# Patient Record
Sex: MALE | Race: WHITE | Employment: OTHER | URBAN - METROPOLITAN AREA
[De-identification: names, ages, dates, MRNs, and addresses within clinical notes are randomized per-mention and may not be internally consistent; named-entity substitution may affect disease eponyms.]

---

## 2022-12-09 ENCOUNTER — HOSPITAL ENCOUNTER (INPATIENT)
Age: 67
LOS: 1 days | Discharge: HOME | DRG: 176 | End: 2022-12-10
Attending: EMERGENCY MEDICINE | Admitting: INTERNAL MEDICINE
Payer: MEDICARE

## 2022-12-09 ENCOUNTER — APPOINTMENT (OUTPATIENT)
Dept: CT IMAGING | Age: 67
DRG: 176 | End: 2022-12-09
Payer: MEDICARE

## 2022-12-09 DIAGNOSIS — I10 ESSENTIAL HYPERTENSION: ICD-10-CM

## 2022-12-09 DIAGNOSIS — S32.020A COMPRESSION FRACTURE OF L2 VERTEBRA, INITIAL ENCOUNTER (HCC): ICD-10-CM

## 2022-12-09 DIAGNOSIS — I26.94 MULTIPLE SUBSEGMENTAL PULMONARY EMBOLI WITHOUT ACUTE COR PULMONALE (HCC): Primary | ICD-10-CM

## 2022-12-09 PROBLEM — I26.93 SINGLE SUBSEGMENTAL PULMONARY EMBOLISM WITHOUT ACUTE COR PULMONALE (HCC): Status: ACTIVE | Noted: 2022-12-09

## 2022-12-09 PROBLEM — I26.99 ACUTE PULMONARY EMBOLISM (HCC): Status: ACTIVE | Noted: 2022-12-09

## 2022-12-09 PROBLEM — E66.9 OBESITY: Status: ACTIVE | Noted: 2022-12-09

## 2022-12-09 LAB
A/G RATIO: 1.2 (ref 1.1–2.2)
ALBUMIN SERPL-MCNC: 4 G/DL (ref 3.4–5)
ALP BLD-CCNC: 59 U/L (ref 40–129)
ALT SERPL-CCNC: 14 U/L (ref 10–40)
ANION GAP SERPL CALCULATED.3IONS-SCNC: 7 MMOL/L (ref 3–16)
ANION GAP SERPL CALCULATED.3IONS-SCNC: 9 MMOL/L (ref 3–16)
APTT: 26.6 SEC (ref 23–34.3)
AST SERPL-CCNC: 14 U/L (ref 15–37)
BASOPHILS ABSOLUTE: 0.1 K/UL (ref 0–0.2)
BASOPHILS ABSOLUTE: 0.1 K/UL (ref 0–0.2)
BASOPHILS RELATIVE PERCENT: 0.8 %
BASOPHILS RELATIVE PERCENT: 0.8 %
BILIRUB SERPL-MCNC: 0.5 MG/DL (ref 0–1)
BILIRUBIN URINE: NEGATIVE
BLOOD, URINE: ABNORMAL
BUN BLDV-MCNC: 12 MG/DL (ref 7–20)
BUN BLDV-MCNC: 12 MG/DL (ref 7–20)
CALCIUM SERPL-MCNC: 8.9 MG/DL (ref 8.3–10.6)
CALCIUM SERPL-MCNC: 9 MG/DL (ref 8.3–10.6)
CHLORIDE BLD-SCNC: 101 MMOL/L (ref 99–110)
CHLORIDE BLD-SCNC: 102 MMOL/L (ref 99–110)
CLARITY: CLEAR
CO2: 25 MMOL/L (ref 21–32)
CO2: 28 MMOL/L (ref 21–32)
COLOR: YELLOW
CREAT SERPL-MCNC: 0.9 MG/DL (ref 0.8–1.3)
CREAT SERPL-MCNC: 1.1 MG/DL (ref 0.8–1.3)
EKG ATRIAL RATE: 70 BPM
EKG ATRIAL RATE: 88 BPM
EKG DIAGNOSIS: NORMAL
EKG DIAGNOSIS: NORMAL
EKG P AXIS: 50 DEGREES
EKG P AXIS: 51 DEGREES
EKG P-R INTERVAL: 128 MS
EKG P-R INTERVAL: 132 MS
EKG Q-T INTERVAL: 374 MS
EKG Q-T INTERVAL: 386 MS
EKG QRS DURATION: 84 MS
EKG QRS DURATION: 86 MS
EKG QTC CALCULATION (BAZETT): 416 MS
EKG QTC CALCULATION (BAZETT): 452 MS
EKG R AXIS: -46 DEGREES
EKG R AXIS: -58 DEGREES
EKG T AXIS: 70 DEGREES
EKG T AXIS: 85 DEGREES
EKG VENTRICULAR RATE: 70 BPM
EKG VENTRICULAR RATE: 88 BPM
EOSINOPHILS ABSOLUTE: 0.1 K/UL (ref 0–0.6)
EOSINOPHILS ABSOLUTE: 0.1 K/UL (ref 0–0.6)
EOSINOPHILS RELATIVE PERCENT: 0.8 %
EOSINOPHILS RELATIVE PERCENT: 1 %
EPITHELIAL CELLS, UA: ABNORMAL /HPF (ref 0–5)
GFR SERPL CREATININE-BSD FRML MDRD: >60 ML/MIN/{1.73_M2}
GFR SERPL CREATININE-BSD FRML MDRD: >60 ML/MIN/{1.73_M2}
GLUCOSE BLD-MCNC: 119 MG/DL (ref 70–99)
GLUCOSE BLD-MCNC: 95 MG/DL (ref 70–99)
GLUCOSE URINE: NEGATIVE MG/DL
HCT VFR BLD CALC: 45 % (ref 40.5–52.5)
HCT VFR BLD CALC: 45.8 % (ref 40.5–52.5)
HEMOGLOBIN: 15.2 G/DL (ref 13.5–17.5)
HEMOGLOBIN: 15.3 G/DL (ref 13.5–17.5)
KETONES, URINE: NEGATIVE MG/DL
LEUKOCYTE ESTERASE, URINE: NEGATIVE
LYMPHOCYTES ABSOLUTE: 1.9 K/UL (ref 1–5.1)
LYMPHOCYTES ABSOLUTE: 2.3 K/UL (ref 1–5.1)
LYMPHOCYTES RELATIVE PERCENT: 17.5 %
LYMPHOCYTES RELATIVE PERCENT: 18.7 %
MCH RBC QN AUTO: 30.4 PG (ref 26–34)
MCH RBC QN AUTO: 31.5 PG (ref 26–34)
MCHC RBC AUTO-ENTMCNC: 33.3 G/DL (ref 31–36)
MCHC RBC AUTO-ENTMCNC: 34 G/DL (ref 31–36)
MCV RBC AUTO: 91.4 FL (ref 80–100)
MCV RBC AUTO: 92.5 FL (ref 80–100)
MICROSCOPIC EXAMINATION: YES
MONOCYTES ABSOLUTE: 1 K/UL (ref 0–1.3)
MONOCYTES ABSOLUTE: 1.2 K/UL (ref 0–1.3)
MONOCYTES RELATIVE PERCENT: 9.2 %
MONOCYTES RELATIVE PERCENT: 9.7 %
MUCUS: ABNORMAL /LPF
NEUTROPHILS ABSOLUTE: 7.1 K/UL (ref 1.7–7.7)
NEUTROPHILS ABSOLUTE: 9.2 K/UL (ref 1.7–7.7)
NEUTROPHILS RELATIVE PERCENT: 70 %
NEUTROPHILS RELATIVE PERCENT: 71.5 %
NITRITE, URINE: NEGATIVE
PDW BLD-RTO: 13.1 % (ref 12.4–15.4)
PDW BLD-RTO: 13.2 % (ref 12.4–15.4)
PH UA: 5.5 (ref 5–8)
PLATELET # BLD: 236 K/UL (ref 135–450)
PLATELET # BLD: 279 K/UL (ref 135–450)
PMV BLD AUTO: 7.9 FL (ref 5–10.5)
PMV BLD AUTO: 8.3 FL (ref 5–10.5)
POTASSIUM REFLEX MAGNESIUM: 3.7 MMOL/L (ref 3.5–5.1)
POTASSIUM SERPL-SCNC: 3.6 MMOL/L (ref 3.5–5.1)
PROTEIN UA: NEGATIVE MG/DL
RBC # BLD: 4.87 M/UL (ref 4.2–5.9)
RBC # BLD: 5.02 M/UL (ref 4.2–5.9)
RBC UA: ABNORMAL /HPF (ref 0–4)
SODIUM BLD-SCNC: 135 MMOL/L (ref 136–145)
SODIUM BLD-SCNC: 137 MMOL/L (ref 136–145)
SPECIFIC GRAVITY UA: 1.02 (ref 1–1.03)
TOTAL PROTEIN: 7.4 G/DL (ref 6.4–8.2)
TROPONIN: <0.01 NG/ML
URINE TYPE: ABNORMAL
UROBILINOGEN, URINE: 0.2 E.U./DL
WBC # BLD: 10.1 K/UL (ref 4–11)
WBC # BLD: 12.9 K/UL (ref 4–11)
WBC UA: ABNORMAL /HPF (ref 0–5)

## 2022-12-09 PROCEDURE — 80053 COMPREHEN METABOLIC PANEL: CPT

## 2022-12-09 PROCEDURE — 93010 ELECTROCARDIOGRAM REPORT: CPT | Performed by: INTERNAL MEDICINE

## 2022-12-09 PROCEDURE — 96374 THER/PROPH/DIAG INJ IV PUSH: CPT

## 2022-12-09 PROCEDURE — 99285 EMERGENCY DEPT VISIT HI MDM: CPT

## 2022-12-09 PROCEDURE — 6360000002 HC RX W HCPCS: Performed by: EMERGENCY MEDICINE

## 2022-12-09 PROCEDURE — 93970 EXTREMITY STUDY: CPT

## 2022-12-09 PROCEDURE — 6370000000 HC RX 637 (ALT 250 FOR IP): Performed by: NURSE PRACTITIONER

## 2022-12-09 PROCEDURE — 96375 TX/PRO/DX INJ NEW DRUG ADDON: CPT

## 2022-12-09 PROCEDURE — 81001 URINALYSIS AUTO W/SCOPE: CPT

## 2022-12-09 PROCEDURE — 6360000004 HC RX CONTRAST MEDICATION: Performed by: EMERGENCY MEDICINE

## 2022-12-09 PROCEDURE — 93005 ELECTROCARDIOGRAM TRACING: CPT | Performed by: INTERNAL MEDICINE

## 2022-12-09 PROCEDURE — 84484 ASSAY OF TROPONIN QUANT: CPT

## 2022-12-09 PROCEDURE — 85730 THROMBOPLASTIN TIME PARTIAL: CPT

## 2022-12-09 PROCEDURE — 2500000003 HC RX 250 WO HCPCS: Performed by: EMERGENCY MEDICINE

## 2022-12-09 PROCEDURE — 74177 CT ABD & PELVIS W/CONTRAST: CPT

## 2022-12-09 PROCEDURE — 93005 ELECTROCARDIOGRAM TRACING: CPT | Performed by: EMERGENCY MEDICINE

## 2022-12-09 PROCEDURE — 1200000000 HC SEMI PRIVATE

## 2022-12-09 PROCEDURE — 2580000003 HC RX 258: Performed by: NURSE PRACTITIONER

## 2022-12-09 PROCEDURE — 71260 CT THORAX DX C+: CPT | Performed by: EMERGENCY MEDICINE

## 2022-12-09 PROCEDURE — 85025 COMPLETE CBC W/AUTO DIFF WBC: CPT

## 2022-12-09 PROCEDURE — 36415 COLL VENOUS BLD VENIPUNCTURE: CPT

## 2022-12-09 RX ORDER — ACETAMINOPHEN 650 MG/1
650 SUPPOSITORY RECTAL EVERY 6 HOURS PRN
Status: DISCONTINUED | OUTPATIENT
Start: 2022-12-09 | End: 2022-12-10 | Stop reason: HOSPADM

## 2022-12-09 RX ORDER — ACETAMINOPHEN 325 MG/1
650 TABLET ORAL EVERY 6 HOURS PRN
Status: DISCONTINUED | OUTPATIENT
Start: 2022-12-09 | End: 2022-12-10 | Stop reason: HOSPADM

## 2022-12-09 RX ORDER — ONDANSETRON 2 MG/ML
4 INJECTION INTRAMUSCULAR; INTRAVENOUS EVERY 6 HOURS PRN
Status: DISCONTINUED | OUTPATIENT
Start: 2022-12-09 | End: 2022-12-10 | Stop reason: HOSPADM

## 2022-12-09 RX ORDER — LISINOPRIL 10 MG/1
5 TABLET ORAL DAILY
Status: DISCONTINUED | OUTPATIENT
Start: 2022-12-09 | End: 2022-12-10 | Stop reason: HOSPADM

## 2022-12-09 RX ORDER — HEPARIN SODIUM 1000 [USP'U]/ML
80 INJECTION, SOLUTION INTRAVENOUS; SUBCUTANEOUS ONCE
Status: COMPLETED | OUTPATIENT
Start: 2022-12-09 | End: 2022-12-09

## 2022-12-09 RX ORDER — SODIUM CHLORIDE 0.9 % (FLUSH) 0.9 %
5-40 SYRINGE (ML) INJECTION PRN
Status: DISCONTINUED | OUTPATIENT
Start: 2022-12-09 | End: 2022-12-10 | Stop reason: HOSPADM

## 2022-12-09 RX ORDER — HEPARIN SODIUM 10000 [USP'U]/100ML
1880 INJECTION, SOLUTION INTRAVENOUS CONTINUOUS
Status: DISCONTINUED | OUTPATIENT
Start: 2022-12-09 | End: 2022-12-09

## 2022-12-09 RX ORDER — ONDANSETRON 4 MG/1
4 TABLET, ORALLY DISINTEGRATING ORAL EVERY 8 HOURS PRN
Status: DISCONTINUED | OUTPATIENT
Start: 2022-12-09 | End: 2022-12-10 | Stop reason: HOSPADM

## 2022-12-09 RX ORDER — KETOROLAC TROMETHAMINE 30 MG/ML
15 INJECTION, SOLUTION INTRAMUSCULAR; INTRAVENOUS ONCE
Status: COMPLETED | OUTPATIENT
Start: 2022-12-09 | End: 2022-12-09

## 2022-12-09 RX ORDER — HEPARIN SODIUM 1000 [USP'U]/ML
40 INJECTION, SOLUTION INTRAVENOUS; SUBCUTANEOUS PRN
Status: DISCONTINUED | OUTPATIENT
Start: 2022-12-09 | End: 2022-12-09

## 2022-12-09 RX ORDER — POLYETHYLENE GLYCOL 3350 17 G/17G
17 POWDER, FOR SOLUTION ORAL DAILY PRN
Status: DISCONTINUED | OUTPATIENT
Start: 2022-12-09 | End: 2022-12-10 | Stop reason: HOSPADM

## 2022-12-09 RX ORDER — SODIUM CHLORIDE 9 MG/ML
INJECTION, SOLUTION INTRAVENOUS PRN
Status: DISCONTINUED | OUTPATIENT
Start: 2022-12-09 | End: 2022-12-10 | Stop reason: HOSPADM

## 2022-12-09 RX ORDER — CLOPIDOGREL BISULFATE 75 MG/1
75 TABLET ORAL DAILY
Status: DISCONTINUED | OUTPATIENT
Start: 2022-12-09 | End: 2022-12-09

## 2022-12-09 RX ORDER — HEPARIN SODIUM 1000 [USP'U]/ML
80 INJECTION, SOLUTION INTRAVENOUS; SUBCUTANEOUS PRN
Status: DISCONTINUED | OUTPATIENT
Start: 2022-12-09 | End: 2022-12-09

## 2022-12-09 RX ORDER — ASPIRIN 81 MG/1
81 TABLET ORAL DAILY
Status: DISCONTINUED | OUTPATIENT
Start: 2022-12-09 | End: 2022-12-10 | Stop reason: HOSPADM

## 2022-12-09 RX ORDER — ATORVASTATIN CALCIUM 40 MG/1
80 TABLET, FILM COATED ORAL NIGHTLY
Status: DISCONTINUED | OUTPATIENT
Start: 2022-12-09 | End: 2022-12-10 | Stop reason: HOSPADM

## 2022-12-09 RX ORDER — TRAMADOL HYDROCHLORIDE 50 MG/1
50 TABLET ORAL EVERY 6 HOURS PRN
Status: DISCONTINUED | OUTPATIENT
Start: 2022-12-09 | End: 2022-12-10 | Stop reason: HOSPADM

## 2022-12-09 RX ORDER — SODIUM CHLORIDE 0.9 % (FLUSH) 0.9 %
5-40 SYRINGE (ML) INJECTION EVERY 12 HOURS SCHEDULED
Status: DISCONTINUED | OUTPATIENT
Start: 2022-12-09 | End: 2022-12-10 | Stop reason: HOSPADM

## 2022-12-09 RX ADMIN — ATORVASTATIN CALCIUM 80 MG: 40 TABLET, FILM COATED ORAL at 22:39

## 2022-12-09 RX ADMIN — LISINOPRIL 5 MG: 10 TABLET ORAL at 08:54

## 2022-12-09 RX ADMIN — SODIUM CHLORIDE, PRESERVATIVE FREE 10 ML: 5 INJECTION INTRAVENOUS at 08:56

## 2022-12-09 RX ADMIN — HEPARIN SODIUM 8340 UNITS: 1000 INJECTION INTRAVENOUS; SUBCUTANEOUS at 03:41

## 2022-12-09 RX ADMIN — ASPIRIN 81 MG: 81 TABLET, COATED ORAL at 08:54

## 2022-12-09 RX ADMIN — CLOPIDOGREL BISULFATE 75 MG: 75 TABLET ORAL at 08:54

## 2022-12-09 RX ADMIN — APIXABAN 10 MG: 5 TABLET, FILM COATED ORAL at 08:54

## 2022-12-09 RX ADMIN — KETOROLAC TROMETHAMINE 15 MG: 30 INJECTION, SOLUTION INTRAMUSCULAR at 02:03

## 2022-12-09 RX ADMIN — IOPAMIDOL 75 ML: 755 INJECTION, SOLUTION INTRAVENOUS at 02:33

## 2022-12-09 RX ADMIN — APIXABAN 10 MG: 5 TABLET, FILM COATED ORAL at 22:38

## 2022-12-09 RX ADMIN — HEPARIN SODIUM 1880 UNITS/HR: 10000 INJECTION, SOLUTION INTRAVENOUS at 03:46

## 2022-12-09 RX ADMIN — SODIUM CHLORIDE, PRESERVATIVE FREE 10 ML: 5 INJECTION INTRAVENOUS at 22:39

## 2022-12-09 ASSESSMENT — PAIN - FUNCTIONAL ASSESSMENT: PAIN_FUNCTIONAL_ASSESSMENT: 0-10

## 2022-12-09 ASSESSMENT — PAIN DESCRIPTION - DESCRIPTORS: DESCRIPTORS: ACHING

## 2022-12-09 ASSESSMENT — PAIN DESCRIPTION - LOCATION: LOCATION: FLANK

## 2022-12-09 ASSESSMENT — PAIN SCALES - GENERAL
PAINLEVEL_OUTOF10: 8
PAINLEVEL_OUTOF10: 10

## 2022-12-09 ASSESSMENT — PAIN DESCRIPTION - ORIENTATION: ORIENTATION: RIGHT

## 2022-12-09 NOTE — CARE COORDINATION
CASE MANAGEMENT INITIAL ASSESSMENT      Chart reviewed for possible discharge needs and plan of care. Met with the patient  today to inform of social work role & services, discuss discharge options and levels of care, community resources, and support systems. Primary contact information: Darnell Izaguirre, cell [address] :  son above    Can this person be reached and be able to respond quickly, such as within a few minutes or hours? Yes  Who would be your back-up decision maker? Name:   Phone Number:    Admission Date:12/9/2022   Status:  [] OBS  [x]  INPT    []Emergency [id number]  Diagnosis: Multiple subsegmental pulmonary emboli without acute cor pulmonale ([facility] [I26.94]  Single subsegmental pulmonary embolism without acute cor pulmonale (HCC) [I26.93]  Compression fracture of L2 vertebra, initial encounter ([facility] [X80.845J]    Is this a Readmission?:  No      Insurance:   [x] Medicare   [facility] Medicare   [] Medicaid   [] Commercial through employer:  [] Currently uninsured    Precert required for SNF: Yes         3 night stay required: No    Living arrangements, Adls, Care Needs, Prior to Admission:   [] Independent with ADL's; able to follow up with PCP and obtain all meds; has good family support  [] Requires assistance to complete ADL's      Durable Medical Equipment at Home:  [] None    [] Margaret Rainey [] Cane [] RTS [] [facility] []Shower Chair  []  02 [] HHN [] CPAP [] BiPap  [] Hospital Bed [] W/C [] Other_____    Services in the home and/or outpatient, prior to admission:  [] None current  [] Active with agency:     Current PCP:Amanda Navarrete                                 Prescription coverage? Yes   Will pt require financial assistance with medications Yes possibly;  lives out of car and has limited resources.      Transportation needs: No     Dialysis Facility: [x] Not applicable  Name:  Address:  Dialysis Schedule:  Phone:  Fax:    PT/OT Recs:  [x] None  [] Home at discharge with PRN assistance  [] Home at discharge with recommendation for [address] [employer] [facility]  [] Discharge to [facility]   [] Discharge to [address]  [] Discharge to [facility] without skilled needs    Hospital Exemption Notification (HEN):   [x] Not applicable  [] To be completed at discharge if SNF    Barriers to discharge:  [] No barriers identified at this time     [x] Barriers to dc & dc planning include: patient is homeless; on a wait list for subsidized housing apt complex in [facility]    Discharge Plan/comments: patient declines option for referral for SNF despite writer's strong recommendations. States he is \"too independent for that. \" Plans to resume living in his car and typically salazar in [employer] parking lot; states he has money to buy food and gas and anticipates no needs. DC planner recommends getting Meds to Beds at dc and confirming appointment with PCP or arranging f/u with our [facility]. Patient states he will f/u with provider but also stated to writer that he did not f/u years ago after cardiac bypass procedure. Resources given for local social service agencies.      [facility]OC on chart for clinician signature    DUDLEY Bryant  12/9/2022

## 2022-12-09 NOTE — ED NOTES
Report called to [facility] RN of C[facility], all questions addressed. Pt aware of admission status with no questions verbalized. PT denies needs at this time. Awaiting admitting RN for pt transport to floor.         Jennifer Zee RN  12/09/22 [id number]

## 2022-12-09 NOTE — ED PROVIDER NOTES
[facility] Emergency Department      CHIEF COMPLAINT  Flank Pain (Right sided flank pain that started at 1200. )      HISTORY OF PRESENT ILLNESS  Rosemary Mahajan is a 74 y.o. male with a history of coronary artery disease presents with right flank and rib pain. He states that started about noon today. He states it is somewhat constant pain but definitely worse when he takes a deep breath. He feels slightly short of breath. He denies fevers or cough. No abdominal pain. The pain is all located in the right posterior, lateral and anterior lower rib cage. No rash. No dysuria, vomiting or diarrhea. .   No other complaints, modifying factors or associated symptoms. I have reviewed the following from the nursing documentation.     Past Medical History:   Diagnosis Date    CAD (coronary artery disease)      Past Surgical History:   Procedure Laterality Date    APPENDECTOMY      CORONARY ANGIOPLASTY WITH STENT PLACEMENT  5/25/15     Family History   Family history unknown: Yes     Social History     Socioeconomic History    Marital status: Single     Spouse name: Not on file    Number of children: Not on file    Years of education: Not on file    Highest education level: Not on file   Occupational History    Not on file   Tobacco Use    Smoking status: Former     Packs/day: 2.00     Types: Cigarettes     Quit date: 2015     Years since quittin.5    Smokeless tobacco: Not on file   Substance and Sexual Activity    Alcohol use: No     Alcohol/week: 0.0 standard drinks    Drug use: No    Sexual activity: Yes   Other Topics Concern    Not on file   Social History Narrative    Not on file     Social Determinants of Health     Financial Resource Strain: Not on file   Food Insecurity: Not on file   Transportation Needs: Not on file   Physical Activity: Not on file   Stress: Not on file   Social Connections: Not on file   Intimate Partner Violence: Not on file   Housing Stability: Not on file Current Facility-Administered Medications   Medication Dose Route Frequency Provider Last Rate Last Admin    heparin (porcine) injection 8,340 Units  80 Units/kg IntraVENous Once Addie Watts MD        heparin (porcine) injection 8,340 Units  80 Units/kg IntraVENous PRN Addie Watts MD        heparin (porcine) injection 4,170 Units  40 Units/kg IntraVENous PRN Addie Watts MD        heparin 25,000 unit in sodium chloride 0.45% 250 mL (premix) infusion  1,880 Units/hr IntraVENous Continuous Addie Watts MD         Current Outpatient Medications   Medication Sig Dispense Refill    atorvastatin (LIPITOR) 80 MG tablet TAKE ONE TABLET BY MOUTH NIGHTLY 30 tablet 0    lisinopril (PRINIVIL;ZESTRIL) 5 MG tablet Take 1 tablet by mouth daily 30 tablet 11    aspirin 81 MG EC tablet Take 1 tablet by mouth daily 30 tablet 3    clopidogrel (PLAVIX) 75 MG tablet Take 1 tablet by mouth daily 30 tablet 11    nitroGLYCERIN (NITROSTAT) 0.4 MG SL tablet Place 1 tablet under the tongue every 5 minutes as needed for Chest pain 25 tablet 1     Allergies   Allergen Reactions    Beta Adrenergic Blockers Other (See Comments)     History of Bradycardia          REVIEW OF SYSTEMS    General:  No fevers  Eyes:  No recent vison changes  ENT:  No sore throat, no nasal congestion  Cardiovascular:  no palpitations  Respiratory: Pleuritic pain in the right ribs with mild shortness of breath. Gastrointestinal:  No abdominal pain, no vomiting, no diarrhea. Right flank pain.   Musculoskeletal:  No muscle pain, no joint pain  Skin:  No rash   Neurologic:  No speech problems, no headache, no extremity numbness, no extremity weakness  Genitourinary:  No dysuria  Extremities:  no edema, no pain      Unless otherwise stated in this report, this patient's positive and negative responses for review of systems (constitutional, eyes, ENT, cardiovascular, respiratory, gastrointestinal, neurological, genitourinary, musculoskeletal, integument systems and systems related to the presenting problem) are either stated in the preceding paragraph, were not pertinent or were negative for the symptoms and/or complaints related to the medical problem. PHYSICAL EXAM  BP (!) 165/98   Pulse 98   Temp 98 °F (36.7 °C) (Oral)   Resp 16   Ht 6' 2\" (1.88 m)   Wt 230 lb (104.3 kg)   SpO2 97%   BMI 29.53 kg/m²   GENERAL APPEARANCE: Awake and alert. Cooperative. No acute distress. HEAD: Normocephalic. Atraumatic. EYES: PERRL. EOM's grossly intact. ENT: Mucous membranes are moist.   NECK: Supple, trachea midline. HEART: RRR. Mild reproducible tenderness over the right lateral rib cage. LUNGS: Respirations unlabored. CTAB. Good air exchange. No wheezes, rales, or rhonchi. Speaking comfortably in full sentences. ABDOMEN: Soft. Non-distended. Non-tender. No guarding or rebound. EXTREMITIES: No peripheral edema. MAEE. No acute deformities. SKIN: Warm, dry and intact. No acute rashes. NEUROLOGICAL: Alert and oriented X 3. CN II-XII grossly intact. PSYCHIATRIC: Normal mood and affect. LABS  I have reviewed all labs for this visit.    Results for orders placed or performed during the hospital encounter of 12/09/22   CBC with Auto Differential   Result Value Ref Range    WBC 12.9 (H) 4.0 - 11.0 K/uL    RBC 5.02 4.20 - 5.90 M/uL    Hemoglobin 15.2 13.5 - 17.5 g/dL    Hematocrit 45.8 40.5 - 52.5 %    MCV 91.4 80.0 - 100.0 fL    MCH 30.4 26.0 - 34.0 pg    MCHC 33.3 31.0 - 36.0 g/dL    RDW 13.2 12.4 - 15.4 %    Platelets 292 589 - 671 K/uL    MPV 8.3 5.0 - 10.5 fL    Neutrophils % 71.5 %    Lymphocytes % 17.5 %    Monocytes % 9.2 %    Eosinophils % 1.0 %    Basophils % 0.8 %    Neutrophils Absolute 9.2 (H) 1.7 - 7.7 K/uL    Lymphocytes Absolute 2.3 1.0 - 5.1 K/uL    Monocytes Absolute 1.2 0.0 - 1.3 K/uL    Eosinophils Absolute 0.1 0.0 - 0.6 K/uL    Basophils Absolute 0.1 0.0 - 0.2 K/uL   Comprehensive Metabolic Panel   Result Value Ref Range Sodium 135 (L) 136 - 145 mmol/L    Potassium 3.6 3.5 - 5.1 mmol/L    Chloride 101 99 - 110 mmol/L    CO2 25 21 - 32 mmol/L    Anion Gap 9 3 - 16    Glucose 119 (H) 70 - 99 mg/dL    BUN 12 7 - 20 mg/dL    Creatinine 0.9 0.8 - 1.3 mg/dL    Est, Glom Filt Rate >60 >60    Calcium 9.0 8.3 - 10.6 mg/dL    Total Protein 7.4 6.4 - 8.2 g/dL    Albumin 4.0 3.4 - 5.0 g/dL    Albumin/Globulin Ratio 1.2 1.1 - 2.2    Total Bilirubin 0.5 0.0 - 1.0 mg/dL    Alkaline Phosphatase 59 40 - 129 U/L    ALT 14 10 - 40 U/L    AST 14 (L) 15 - 37 U/L   Urinalysis   Result Value Ref Range    Color, UA Yellow Straw/Yellow    Clarity, UA Clear Clear    Glucose, Ur Negative Negative mg/dL    Bilirubin Urine Negative Negative    Ketones, Urine Negative Negative mg/dL    Specific Gravity, UA 1.020 1.005 - 1.030    Blood, Urine SMALL (A) Negative    pH, UA 5.5 5.0 - 8.0    Protein, UA Negative Negative mg/dL    Urobilinogen, Urine 0.2 <2.0 E.U./dL    Nitrite, Urine Negative Negative    Leukocyte Esterase, Urine Negative Negative    Microscopic Examination YES     Urine Type NotGiven    Troponin   Result Value Ref Range    Troponin <0.01 <0.01 ng/mL   EKG 12 Lead   Result Value Ref Range    Ventricular Rate 88 BPM    Atrial Rate 88 BPM    P-R Interval 128 ms    QRS Duration 84 ms    Q-T Interval 374 ms    QTc Calculation (Bazett) 452 ms    P Axis 50 degrees    R Axis -58 degrees    T Axis 70 degrees    Diagnosis       Sinus rhythm with Premature supraventricular complexesPossible Left atrial enlargementLeft axis deviationNonspecific ST abnormalityAbnormal ECGWhen compared with ECG of 26-MAY-2015 06:09,Premature supraventricular complexes are now PresentVent. rate has increased BY  31 BPMT wave inversion no longer evident in Inferior leads         EKG  The Ekg interpreted by myself  normal sinus rhythm with a rate of 88  Axis is   Left axis deviation  QTc is  normal  Intervals and Durations are unremarkable.       No specific ST-T wave changes appreciated. No evidence of acute ischemia. No significant change from prior EKG dated May 26, 2015    Cardiac Monitoring: The cardiac monitor revealed normal sinus rhythm as interpreted by me. The cardiac monitor was ordered secondary to the patient's complaint of chest pain and to monitor the patient for dysrhythmia. RADIOLOGY  X-RAYS: ALL IMAGES INCLUDING PLAIN FILMS, CT, ULTRASOUND AND MRI HAVE BEEN READ BY THE RADIOLOGIST. I have personally reviewed plain film images and have reviewed the radiology reports. CT ABDOMEN PELVIS W IV CONTRAST Additional Contrast? None   Preliminary Result   1. Acute segmental to subsegmental pulmonary emboli noted in the right lower   lobe, with a nonocclusive embolism in the right middle lobe. No central   emboli or findings to suggest cardiac strain. This was discussed with Dr. Radha Mathews in the emergency department at 3:24 a.m. on 12/09/2022.   2. Subsegmental ground-glass infiltrate in the right lower lobe felt to   represent a pulmonary infarct. 3. Patchy atelectatic changes seen otherwise. 4. Atherosclerosis including coronary artery involvement. 5. There is an acute to subacute mild superior endplate compression fracture   of L2 with approximately 20-30% height loss. 6. No acute intra-abdominal or pelvic abnormality. CT CHEST PULMONARY EMBOLISM W CONTRAST   Preliminary Result   1. Acute segmental to subsegmental pulmonary emboli noted in the right lower   lobe, with a nonocclusive embolism in the right middle lobe. No central   emboli or findings to suggest cardiac strain. This was discussed with Dr. Radha Mathews in the emergency department at 3:24 a.m. on 12/09/2022.   2. Subsegmental ground-glass infiltrate in the right lower lobe felt to   represent a pulmonary infarct. 3. Patchy atelectatic changes seen otherwise. 4. Atherosclerosis including coronary artery involvement.    5. There is an acute to subacute mild superior endplate compression fracture   of L2 with approximately 20-30% height loss. 6. No acute intra-abdominal or pelvic abnormality. Rechecks: Physical assessment performed. Patient has been updated on his CT findings and is agreeable to admission. Pain is improved after Toradol here. Critical Care:I personally spent a total of 40 minutes of critical care time in obtaining history, performing a physical exam, bedside monitoring of interventions, collecting and interpreting tests and discussion with consultants but excluding time spent performing procedures, treating other patients and teaching time. Sepsis:  Is this patient to be included in the SEP-1 Core Measure due to severe sepsis or septic shock? No   Exclusion criteria - the patient is NOT to be included for SEP-1 Core Measure due to: Infection is not suspected       ED COURSE/MDM  Patient seen and evaluated. Here the patient is afebrile with normal vitals signs, other than hypertension which she has a history of. Old records reviewed. Here the patient has pleuritic pain on the right with most of the pain located in the right lateral ribs. He has a benign abdominal exam.  EKG shows sinus rhythm with no ischemic changes. Troponin is negative, lab work all appears to be at baseline. Did get a CT of the chest to evaluate for PE given the pleuritic pain. CT does show a acute segmental and subsegmental right middle lobe and lower lobe PE. There is also evidence of pulmonary infarct. In light of this I have started a heparin drip and will admit him to the hospitalist for further work-up. He also has an incidental finding of a subacute compression fracture of L2. He is not having any back pain at the site. Labs and imaging reviewed and results discussed with patient. Patient was reassessed as noted above . Plan of care discussed with patient. Patient in agreement with plan. CLINICAL IMPRESSION  1. Multiple subsegmental pulmonary emboli without acute cor pulmonale (HCC)    2. Compression fracture of L2 vertebra, initial encounter ([facility])        Blood pressure (!) 165/98, pulse 98, temperature 98 °F (36.7 °C), temperature source Oral, resp. rate 16, height 6' 2\" (1.88 m), weight 230 lb (104.3 kg), SpO2 97 %. DISPOSITION  Gayathri Goodrich was admitted in stable condition. Dean Lozada MD am the primary clinician of record.     (Please note this note was completed with a voice recognition program.  Efforts were made to edit the dictations but occasionally words are mis-transcribed.)        Jesus Cowden, MD  12/09/22 2259

## 2022-12-09 NOTE — PROGRESS NOTES
4 Eyes Skin Assessment     NAME:  Paul Richey  YOB: 1955  MEDICAL RECORD NUMBER:  [id number]    The patient is being assessed for  Admission    I agree that One RN have performed a thorough Head to Toe Skin Assessment on the patient. ALL assessment sites listed below have been assessed. Areas assessed by both nurses:    Head, Face, Ears, Shoulders, Back, Chest, Arms, Elbows, Hands, Sacrum. Buttock, Coccyx, Ischium, and Legs. Feet and Heels        Does the Patient have a Wound?  No noted wound(s)       Earnest Prevention initiated by RN: No   Wound Care Orders initiated by RN: No    Pressure Injury (Stage 3,4, Unstageable, DTI, NWPT, and Complex wounds) if present place referral order by RN under : No    New and Established Ostomies, if present place, referral order under : No      Nurse 1 eSignature: Electronically signed by Tucker Cuba RN on 12/9/22 at 7:01 AM EST    **SHARE this note so that the co-signing nurse is able to place an eSignature**    Nurse 2 eSignature: Electronically signed by Aura Mccarty RN on 12/9/22 at 7:02 AM EST

## 2022-12-09 NOTE — CONSULTS
Pharmacy to Manage Heparin Infusion per Hospital Policy    Dx: PE  Pt wt = 104.3 kg  Baseline aPTT = __ at __    Oral factor Xa-inhibitors may alter and elevate anti-Xa levels used for unfractionated heparin monitoring. As a result, anti-Xa monitoring is not accurate while Xa-inhibitor activity is detectable. Utilize aPTT monitoring when patient received an oral factor Xa-inhibitor (apixaban, betrixaban, edoxaban or rivaroxaban) within 72 hours prior to admission (please document last administration time). The goal is to allow a washout of oral factor Xa-inhibitors by using aPTT for 72 hours, then change to ant-Xa levels for UFH. Heparin (weight-based) Infusion: VTE/DVT/PE  Heparin 80 units/kg IVP bolus (max 10,000 units) followed by Heparin infusion at 18 units/kg/hr (recommended max initial rate: 2100 units/hr). Recheck anti-Xa (unless aPTT being used) in 6 hours. Goal anti-Xa 0.3-0.7 IU/mL  Goal aPTT =  seconds.   Anuj Pedroza D.12/9/2022 3:37 AM

## 2022-12-09 NOTE — H&P
The patient      Hospital Medicine History & Physical      PCP: Brittani Meade    Date of Admission: 12/9/2022    Date of Service: Pt seen/examined on 12/9/2022 and Admitted to Inpatient with expected LOS greater than two midnights due to medical therapy. Chief Complaint: Right flank pain and shortness of breath    History Of Present Illness:      74 y.o. male, with past medical history of hypertension, CAD, hyperlipidemia and obesity, who presented to [facility] with right flank pain and shortness of breath. History obtained from the patient and review of EMR. Stated around noon this afternoon he developed a right flank pain that radiated to his rib. He stated the pain was worse on deep inspiration. The patient stated he is homeless and has been living in his car in the [place] parking lot since April. He stated throughout the day his symptoms were getting worse so he decided to go to the emergency room for further evaluation. In the emergency room a CT abdomen pelvis was obtained that revealedan acute to subacute mild superior endplate compression fracture of L2 with approximately 20 to 30% height loss. No acute intra-abdominal or pelvic abnormality. A CT chest pulmonary embolism was also obtained that revealedAcute segmental to subsegmental pulmonary emboli noted in the right lower lobe, with a nonocclusive embolism in the right middle lobe. No central emboli or findings to suggest cardiac strain. Subsegmental groundglass infiltrate in the right lower lobe felt to represent a pulmonary infarct. The patient was started on Eliquis. The patient stated he does not get out and move around a lot and is not able to eat a healthy diet. He has been admitted for further evaluation and treatment. Bilateral lower extremity venous Dopplers have been ordered. The patient denied any other associated symptoms as well as any aggravating and/or alleviating factors.  At the time of this assessment, the patient was resting comfortably in bed. He currently denies any chest pain, back pain, abdominal pain, shortness of breath, numbness, tingling, N/V/C/D, fever and/or chills. Past Medical History:          Diagnosis Date    CAD (coronary artery disease)      Past Surgical History:          Procedure Laterality Date    APPENDECTOMY      CORONARY ANGIOPLASTY WITH STENT PLACEMENT  5/25/15     Medications Prior to Admission:      Prior to Admission medications    Medication Sig Start Date End Date Taking? Authorizing Provider   atorvastatin (LIPITOR) 80 MG tablet TAKE ONE TABLET BY MOUTH NIGHTLY 5/5/16   Laurie Schafer MD   lisinopril (PRINIVIL;ZESTRIL) 5 MG tablet Take 1 tablet by mouth daily 9/10/15   Laurie Schafer MD   aspirin 81 MG EC tablet Take 1 tablet by mouth daily 5/28/15   [facility] GurvinderimOSWALD CNP   clopidogrel (PLAVIX) 75 MG tablet Take 1 tablet by mouth daily 5/28/15   [facility] GurvinderimOSWALD CNP   nitroGLYCERIN (NITROSTAT) 0.4 MG SL tablet Place 1 tablet under the tongue every 5 minutes as needed for Chest pain 5/28/15   Bridget Brim, APRN - CNP     Allergies:  Beta adrenergic blockers    Social History:      The patient currently lives at home    TOBACCO:   reports that he quit smoking about 7 years ago. He smoked an average of 2 packs per day. He does not have any smokeless tobacco history on file. ETOH:   reports no history of alcohol use. E-cigarette/Vaping       Questions Responses    E-cigarette/Vaping Use     Start Date     Passive Exposure     Quit Date     Counseling Given     Comments           Family History:      Reviewed and negative in regards to presenting illness/complaint. Family history unknown: Yes       REVIEW OF SYSTEMS COMPLETED:   Pertinent positives as noted in the HPI. All other systems reviewed and negative.     PHYSICAL EXAM PERFORMED:    BP (!) 165/98   Pulse 98   Temp 98 °F (36.7 °C) (Oral)   Resp 16   Ht 6' 2\" (1.88 m)   Wt 230 lb (104.3 kg) SpO2 97%   BMI 29.53 kg/m²     General appearance: Pleasant, obese male in no apparent distress, appears stated age and cooperative. HEENT:. Pupils equal, round, and reactive to light. Extra ocular muscles intact. Conjunctivae/corneas clear. Neck: Supple, with full range of motion. No jugular venous distention. Trachea midline. Respiratory:  Normal respiratory effort. Clear to auscultation, bilaterally without Rales/Wheezes/Rhonchi. Cardiovascular:  Regular rate and rhythm with normal S1/S2 without murmurs, rubs or gallops. Abdomen: Soft, obese, round non-tender, non-distended with normal bowel sounds. Musculoskeletal:  No clubbing, cyanosis or edema bilaterally. Full range of motion without deformity. Skin: Skin color, texture, turgor normal.  No significant rashes or lesions. Neurologic:  Neurovascularly intact Cranial nerves: II-XII intact, grossly non-focal.  Psychiatric:  Alert and oriented, thought content appropriate, normal insight  Capillary Refill: Brisk,3 seconds, normal  Peripheral Pulses: +2 palpable, equal bilaterally     Labs:     Recent Labs     12/09/22 0148   WBC 12.9*   HGB 15.2   HCT 45.8        Recent Labs     12/09/22 0148   *   K 3.6      CO2 25   BUN 12   CREATININE 0.9   CALCIUM 9.0     Recent Labs     12/09/22 0148   AST 14*   ALT 14   BILITOT 0.5   ALKPHOS 59     Recent Labs     12/09/22 0148   TROPONINI <0.01     Urinalysis:      Lab Results   Component Value Date/Time    NITRU Negative 12/09/2022 03:02 AM    WBCUA 0-2 12/09/2022 03:02 AM    RBCUA 0-2 12/09/2022 03:02 AM    BLOODU SMALL 12/09/2022 03:02 AM    SPECGRAV 1.020 12/09/2022 03:02 AM    GLUCOSEU Negative 12/09/2022 03:02 AM     Radiology:     CXR: I have reviewed the CXR with the following interpretation: N/A    EKG:  I have reviewed the EKG with the following interpretation: The Ekg interpreted by me in the absence of a cardiologist shows.  Sinus rhythm with Premature supraventricular complexesPossible Left atrial enlargementLeft axis deviationNonspecific ST abnormalityAbnormal ECGWhen compared with ECG of 26-MAY-2015 06:09,Premature supraventricular complexes are now PresentVent. rate has increased BY  31 BPMT wave inversion no longer evident in Inferior leads    CT ABDOMEN PELVIS W IV CONTRAST Additional Contrast? None   Preliminary Result   1. Acute segmental to subsegmental pulmonary emboli noted in the right lower   lobe, with a nonocclusive embolism in the right middle lobe. No central   emboli or findings to suggest cardiac strain. This was discussed with Dr. Caleb Jackman in the emergency department at 3:24 a.m. on 12/09/2022.   2. Subsegmental ground-glass infiltrate in the right lower lobe felt to   represent a pulmonary infarct. 3. Patchy atelectatic changes seen otherwise. 4. Atherosclerosis including coronary artery involvement. 5. There is an acute to subacute mild superior endplate compression fracture   of L2 with approximately 20-30% height loss. 6. No acute intra-abdominal or pelvic abnormality. CT CHEST PULMONARY EMBOLISM W CONTRAST   Preliminary Result   1. Acute segmental to subsegmental pulmonary emboli noted in the right lower   lobe, with a nonocclusive embolism in the right middle lobe. No central   emboli or findings to suggest cardiac strain. This was discussed with Dr. Caleb Jackman in the emergency department at 3:24 a.m. on 12/09/2022.   2. Subsegmental ground-glass infiltrate in the right lower lobe felt to   represent a pulmonary infarct. 3. Patchy atelectatic changes seen otherwise. 4. Atherosclerosis including coronary artery involvement. 5. There is an acute to subacute mild superior endplate compression fracture   of L2 with approximately 20-30% height loss. 6. No acute intra-abdominal or pelvic abnormality.            Consults:    IP CONSULT TO HOSPITALIST    ASSESSMENT:    Active Hospital Problems    Diagnosis Date Noted    HTN (hypertension) [I10] 12/09/2022     Priority: Medium    Obesity [E66.9] 12/09/2022     Priority: Medium    Acute pulmonary embolism ([address] [I26.99] 12/09/2022     Priority: Medium    HLD (hyperlipidemia) [E78.5] 09/10/2015    CAD (coronary artery disease) [I25.10]      PLAN:    Right flank pain and shortness of breath in setting of acute pulmonary embolism. -CT abdomen and pelvis revealed: There is an acute to subacute mild superior endplate compression fracture of L2 with approximately 20 to 30% height loss. No acute intra-abdominal or pelvic abnormality. -CT chest pulmonary embolism revealed: Acute segmental to subsegmental pulmonary emboli noted in the right lower lobe, with a nonocclusive embolism in the right middle lobe. No central emboli or findings to suggest cardiac strain. Subsegmental groundglass infiltrate in the right lower lobe felt to represent a pulmonary infarct.  -Eliquis initiated in ED and continued twice daily.  -Telemetry monitoring  -prn pain medication  -oxygen therapy if needed  -bilateral venous dopplers lower extremities    Essential hypertension in setting of known CAD  -Continue lisinopril  -Continue aspirin and Plavix    Hyperlipidemia  -Continue atorvastatin    Obesity  With Body mass index is 30 kg/m². Complicating assessment and treatment. Placing patient at risk for multiple co-morbidities as well as early death and contributing to the patient's presentation. Counseled on weight loss     Leukocytosis  -Secondary to stress response  -CBC in a.m.  -UA negative for infection    DVT Prophylaxis: Eliquis    Diet: No diet orders on file    Code Status: Prior    PT/OT Eval Status: No indication for need at this time    Dispo -2-3 days pending clinical improvement     Freddie Cantor, APRN - CNP    Thank you Jabier Egan for the opportunity to be involved in this patient's care.  If you have any questions or concerns please feel free to contact me at [phone].  --------------------------DR. Indra Mitchell----------------------------

## 2022-12-10 VITALS
OXYGEN SATURATION: 93 % | HEART RATE: 66 BPM | RESPIRATION RATE: 18 BRPM | HEIGHT: 74 IN | BODY MASS INDEX: 29.52 KG/M2 | SYSTOLIC BLOOD PRESSURE: 147 MMHG | TEMPERATURE: 98.2 F | WEIGHT: 230 LBS | DIASTOLIC BLOOD PRESSURE: 88 MMHG

## 2022-12-10 LAB
ANION GAP SERPL CALCULATED.3IONS-SCNC: 8 MMOL/L (ref 3–16)
BASOPHILS ABSOLUTE: 0.1 K/UL (ref 0–0.2)
BASOPHILS RELATIVE PERCENT: 0.7 %
BUN BLDV-MCNC: 12 MG/DL (ref 7–20)
CALCIUM SERPL-MCNC: 8.9 MG/DL (ref 8.3–10.6)
CHLORIDE BLD-SCNC: 102 MMOL/L (ref 99–110)
CO2: 26 MMOL/L (ref 21–32)
CREAT SERPL-MCNC: 0.9 MG/DL (ref 0.8–1.3)
EOSINOPHILS ABSOLUTE: 0.1 K/UL (ref 0–0.6)
EOSINOPHILS RELATIVE PERCENT: 0.6 %
GFR SERPL CREATININE-BSD FRML MDRD: >60 ML/MIN/{1.73_M2}
GLUCOSE BLD-MCNC: 108 MG/DL (ref 70–99)
HCT VFR BLD CALC: 41 % (ref 40.5–52.5)
HEMOGLOBIN: 14.4 G/DL (ref 13.5–17.5)
LYMPHOCYTES ABSOLUTE: 1.9 K/UL (ref 1–5.1)
LYMPHOCYTES RELATIVE PERCENT: 17.9 %
MCH RBC QN AUTO: 32.1 PG (ref 26–34)
MCHC RBC AUTO-ENTMCNC: 35.1 G/DL (ref 31–36)
MCV RBC AUTO: 91.4 FL (ref 80–100)
MONOCYTES ABSOLUTE: 1.2 K/UL (ref 0–1.3)
MONOCYTES RELATIVE PERCENT: 11.6 %
NEUTROPHILS ABSOLUTE: 7.4 K/UL (ref 1.7–7.7)
NEUTROPHILS RELATIVE PERCENT: 69.2 %
PDW BLD-RTO: 12.9 % (ref 12.4–15.4)
PLATELET # BLD: 238 K/UL (ref 135–450)
PMV BLD AUTO: 8.1 FL (ref 5–10.5)
POTASSIUM REFLEX MAGNESIUM: 3.6 MMOL/L (ref 3.5–5.1)
RBC # BLD: 4.48 M/UL (ref 4.2–5.9)
SODIUM BLD-SCNC: 136 MMOL/L (ref 136–145)
WBC # BLD: 10.7 K/UL (ref 4–11)

## 2022-12-10 PROCEDURE — 2580000003 HC RX 258: Performed by: NURSE PRACTITIONER

## 2022-12-10 PROCEDURE — 80048 BASIC METABOLIC PNL TOTAL CA: CPT

## 2022-12-10 PROCEDURE — 6370000000 HC RX 637 (ALT 250 FOR IP): Performed by: NURSE PRACTITIONER

## 2022-12-10 PROCEDURE — 36415 COLL VENOUS BLD VENIPUNCTURE: CPT

## 2022-12-10 PROCEDURE — 85025 COMPLETE CBC W/AUTO DIFF WBC: CPT

## 2022-12-10 RX ORDER — ATORVASTATIN CALCIUM 80 MG/1
80 TABLET, FILM COATED ORAL DAILY
Qty: 30 TABLET | Refills: 0 | Status: SHIPPED | OUTPATIENT
Start: 2022-12-10

## 2022-12-10 RX ORDER — LISINOPRIL 5 MG/1
5 TABLET ORAL DAILY
Qty: 30 TABLET | Refills: 0 | Status: SHIPPED | OUTPATIENT
Start: 2022-12-10

## 2022-12-10 RX ADMIN — SODIUM CHLORIDE, PRESERVATIVE FREE 10 ML: 5 INJECTION INTRAVENOUS at 10:07

## 2022-12-10 RX ADMIN — ASPIRIN 81 MG: 81 TABLET, COATED ORAL at 10:07

## 2022-12-10 RX ADMIN — APIXABAN 10 MG: 5 TABLET, FILM COATED ORAL at 10:06

## 2022-12-10 RX ADMIN — LISINOPRIL 5 MG: 10 TABLET ORAL at 10:06

## 2022-12-10 ASSESSMENT — PAIN SCALES - GENERAL
PAINLEVEL_OUTOF10: 0
PAINLEVEL_OUTOF10: 0

## 2022-12-10 NOTE — PLAN OF CARE
Problem: Discharge Planning  Goal: Discharge to home or other facility with appropriate resources  12/10/2022 1240 by Shasta Mooney RN  Outcome: Adequate for Discharge  12/10/2022 0532 by Vicente Johns RN  Outcome: Progressing

## 2022-12-10 NOTE — PROGRESS NOTES
Hospitalist Progress Note      PCP: Arden German    Date of Admission: 12/9/2022    Chief Complaint: right flank pain, SOB    Hospital Course:   74 y.o. male, with past medical history of hypertension, CAD, hyperlipidemia and obesity, who presented to [facility] with right flank pain and shortness of breath. History obtained from the patient and review of EMR. Stated around noon this afternoon he developed a right flank pain that radiated to his rib. He stated the pain was worse on deep inspiration. The patient stated he is homeless and has been living in his car in the [employer] [place] since April. He stated throughout the day his symptoms were getting worse so he decided to go to the emergency room for further evaluation. In the emergency room a CT abdomen pelvis was obtained that revealedan acute to subacute mild superior endplate compression fracture of L2 with approximately 20 to 30% height loss. No acute intra-abdominal or pelvic abnormality. A CT chest pulmonary embolism was also obtained that revealedAcute segmental to subsegmental pulmonary emboli noted in the right lower lobe, with a nonocclusive embolism in the right middle lobe. No central emboli or findings to suggest cardiac strain. Subsegmental groundglass infiltrate in the right lower lobe felt to represent a pulmonary infarct. The patient was started on Eliquis. The patient stated he does not get out and move around a lot and is not able to eat a healthy diet. He has been admitted for further evaluation and treatment. Bilateral lower extremity venous Dopplers have been ordered. The patient denied any other associated symptoms as well as any aggravating and/or alleviating factors. At the time of this assessment, the patient was resting comfortably in bed. He currently denies any chest pain, back pain, abdominal pain, shortness of breath, numbness, tingling, N/V/C/D, fever and/or chills.      Subjective: still having right flank pain. SOB improved. Medications:  Reviewed    Infusion Medications    sodium chloride       Scheduled Medications    apixaban  10 mg Oral BID    Followed by    Lindy Cohen ON 12/16/2022] apixaban  5 mg Oral BID    aspirin  81 mg Oral Daily    atorvastatin  80 mg Oral Nightly    lisinopril  5 mg Oral Daily    sodium chloride flush  5-40 mL IntraVENous 2 times per day     PRN Meds: sodium chloride flush, sodium chloride, ondansetron **OR** ondansetron, polyethylene glycol, acetaminophen **OR** acetaminophen, traMADol    No intake or output data in the 24 hours ending 12/09/22 2126    Physical Exam Performed:    BP (!) 158/88   Pulse 91   Temp 98.7 °F (37.1 °C) (Oral)   Resp 20   Ht 6' 2\" (1.88 m)   Wt 230 lb (104.3 kg)   SpO2 95%   BMI 29.53 kg/m²     General appearance: Pleasant, obese male in no apparent distress, appears stated age and cooperative. HEENT:. Pupils equal, round, and reactive to light. Extra ocular muscles intact. Conjunctivae/corneas clear. Neck: Supple, with full range of motion. No jugular venous distention. Trachea midline. Respiratory:  Normal respiratory effort. Clear to auscultation, bilaterally without Rales/Wheezes/Rhonchi. Cardiovascular:  Regular rate and rhythm with normal S1/S2 without murmurs, rubs or gallops. Abdomen: Soft, obese, round non-tender, non-distended with normal bowel sounds. Musculoskeletal:  No clubbing, cyanosis or edema bilaterally. Full range of motion without deformity. Skin: Skin color, texture, turgor normal.  No significant rashes or lesions.   Neurologic:  Neurovascularly intact Cranial nerves: II-XII intact, grossly non-focal.  Psychiatric:  Alert and oriented, thought content appropriate, normal insight  Capillary Refill: Brisk,3 seconds, normal  Peripheral Pulses: +2 palpable, equal bilaterally       Labs:   Recent Labs     12/09/22  0148 12/09/22  1048   WBC 12.9* 10.1   HGB 15.2 15.3   HCT 45.8 45.0    236     Recent Labs 12/09/22 0148 12/09/22  1048   * 137   K 3.6 3.7    102   CO2 25 28   BUN 12 12   CREATININE 0.9 1.1   CALCIUM 9.0 8.9     Recent Labs     12/09/22 0148   AST 14*   ALT 14   BILITOT 0.5   ALKPHOS 59     No results for input(s): INR in the last 72 hours. Recent Labs     12/09/22 0148   TROPONINI <0.01       Urinalysis:      Lab Results   Component Value Date/Time    NITRU Negative 12/09/2022 03:02 AM    WBCUA 0-2 12/09/2022 03:02 AM    RBCUA 0-2 12/09/2022 03:02 AM    BLOODU SMALL 12/09/2022 03:02 AM    SPECGRAV 1.020 12/09/2022 03:02 AM    GLUCOSEU Negative 12/09/2022 03:02 AM       Radiology:  VL Extremity Venous Bilateral   Final Result      CT ABDOMEN PELVIS W IV CONTRAST Additional Contrast? None   Preliminary Result   1. Acute segmental to subsegmental pulmonary emboli noted in the right lower   lobe, with a nonocclusive embolism in the right middle lobe. No central   emboli or findings to suggest cardiac strain. This was discussed with Dr. Teodora Rendon in the emergency department at 3:24 a.m. on 12/09/2022.   2. Subsegmental ground-glass infiltrate in the right lower lobe felt to   represent a pulmonary infarct. 3. Patchy atelectatic changes seen otherwise. 4. Atherosclerosis including coronary artery involvement. 5. There is an acute to subacute mild superior endplate compression fracture   of L2 with approximately 20-30% height loss. 6. No acute intra-abdominal or pelvic abnormality. CT CHEST PULMONARY EMBOLISM W CONTRAST   Preliminary Result   1. Acute segmental to subsegmental pulmonary emboli noted in the right lower   lobe, with a nonocclusive embolism in the right middle lobe. No central   emboli or findings to suggest cardiac strain. This was discussed with Dr. Teodora Rendon in the emergency department at 3:24 a.m. on 12/09/2022.   2. Subsegmental ground-glass infiltrate in the right lower lobe felt to   represent a pulmonary infarct.    3. Patchy atelectatic changes seen otherwise. 4. Atherosclerosis including coronary artery involvement. 5. There is an acute to subacute mild superior endplate compression fracture   of L2 with approximately 20-30% height loss. 6. No acute intra-abdominal or pelvic abnormality. IP CONSULT TO HOSPITALIST    Assessment/Plan:    Active Hospital Problems    Diagnosis     HTN (hypertension) [I10]      Priority: Medium    Obesity [E66.9]      Priority: Medium    Acute pulmonary embolism (HCC) [I26.99]      Priority: Medium    Single subsegmental pulmonary embolism without acute cor pulmonale (HCC) [I26.93]      Priority: Medium    HLD (hyperlipidemia) [E78.5]     CAD (coronary artery disease) [I25.10]      Acute PE  - continue eliquis  - LE doppler RLE DVT  - pain control ordered  - stable on room air    CAD  - poor compliance  - continue ASA, statin. Unable to tolerate BB    HTN  - poorly controlled  - continue lisinopril    HLD  - continue statin    DVT Prophylaxis: eliquis  Diet: ADULT DIET;  Regular  Code Status: Full Code  PT/OT Eval Status: not ordered    Dispo - home tomorrow    Appropriate for A1 Discharge Unit: No      Terry Pratt MD

## 2022-12-11 NOTE — DISCHARGE SUMMARY
Hospital Medicine Discharge Summary    Patient ID: Gayathri Goodrich      Patient's PCP: Sebastien Valenzuela    Admit Date: 12/9/2022     Discharge Date: 12/10/2022      Admitting Provider: Richie Perez MD     Discharge Provider: Ranjit Waller MD     Discharge Diagnoses: Active Hospital Problems    Diagnosis     HTN (hypertension) [I10]      Priority: Medium    Obesity [E66.9]      Priority: Medium    Acute pulmonary embolism (HCC) [I26.99]      Priority: Medium    Single subsegmental pulmonary embolism without acute cor pulmonale (HCC) [I26.93]      Priority: Medium    HLD (hyperlipidemia) [E78.5]     CAD (coronary artery disease) [I25.10]        The patient was seen and examined on day of discharge and this discharge summary is in conjunction with any daily progress note from day of discharge. Hospital Course:   74 y.o. male, with past medical history of hypertension, CAD, hyperlipidemia and obesity, who presented to [facility] with right flank pain and shortness of breath. History obtained from the patient and review of EMR. Stated around noon this afternoon he developed a right flank pain that radiated to his rib. He stated the pain was worse on deep inspiration. The patient stated he is homeless and has been living in his car in the [facility] since April. He stated throughout the day his symptoms were getting worse so he decided to go to the emergency room for further evaluation. In the emergency room a CT abdomen pelvis was obtained that revealedan acute to subacute mild superior endplate compression fracture of L2 with approximately 20 to 30% height loss. No acute intra-abdominal or pelvic abnormality. A CT chest pulmonary embolism was also obtained that revealedAcute segmental to subsegmental pulmonary emboli noted in the right lower lobe, with a nonocclusive embolism in the right middle lobe. No central emboli or findings to suggest cardiac strain.   Subsegmental groundglass infiltrate in the right lower lobe felt to represent a pulmonary infarct. The patient was started on Eliquis. The patient stated he does not get out and move around a lot and is not able to eat a healthy diet. He has been admitted for further evaluation and treatment. Bilateral lower extremity venous Dopplers have been ordered. The patient denied any other associated symptoms as well as any aggravating and/or alleviating factors. At the time of this assessment, the patient was resting comfortably in bed. He currently denies any chest pain, back pain, abdominal pain, shortness of breath, numbness, tingling, N/V/C/D, fever and/or chills. Acute PE  - continue eliquis  - LE doppler RLE DVT  - stable on room air     CAD  - poor compliance  - continue ASA, statin. Unable to tolerate BB     HTN  - poorly controlled  - continue lisinopril     HLD  - continue statin    Physical Exam Performed:     BP (!) 147/88   Pulse 66   Temp 98.2 °F (36.8 °C) (Oral)   Resp 18   Ht 6' 2\" (1.88 m)   Wt 230 lb (104.3 kg)   SpO2 93%   BMI 29.53 kg/m²       General appearance: Pleasant, obese male in no apparent distress, appears stated age and cooperative. HEENT:. Pupils equal, round, and reactive to light. Extra ocular muscles intact. Conjunctivae/corneas clear. Neck: Supple, with full range of motion. No jugular venous distention. Trachea midline. Respiratory:  Normal respiratory effort. Clear to auscultation, bilaterally without Rales/Wheezes/Rhonchi. Cardiovascular:  Regular rate and rhythm with normal S1/S2 without murmurs, rubs or gallops. Abdomen: Soft, obese, round non-tender, non-distended with normal bowel sounds. Musculoskeletal:  No clubbing, cyanosis or edema bilaterally. Full range of motion without deformity. Skin: Skin color, texture, turgor normal.  No significant rashes or lesions.   Neurologic:  Neurovascularly intact Cranial nerves: II-XII intact, grossly non-focal.  Psychiatric:  Alert and oriented, thought content appropriate, normal insight  Capillary Refill: Brisk,3 seconds, normal  Peripheral Pulses: +2 palpable, equal bilaterally       Labs: For convenience and continuity at follow-up the following most recent labs are provided:      CBC:    Lab Results   Component Value Date/Time    WBC 10.7 12/10/2022 06:53 AM    HGB 14.4 12/10/2022 06:53 AM    HCT 41.0 12/10/2022 06:53 AM     12/10/2022 06:53 AM       Renal:    Lab Results   Component Value Date/Time     12/10/2022 06:53 AM    K 3.6 12/10/2022 06:53 AM     12/10/2022 06:53 AM    CO2 26 12/10/2022 06:53 AM    BUN 12 12/10/2022 06:53 AM    CREATININE 0.9 12/10/2022 06:53 AM    CALCIUM 8.9 12/10/2022 06:53 AM         Significant Diagnostic Studies    Radiology:   VL Extremity Venous Bilateral   Final Result      CT ABDOMEN PELVIS W IV CONTRAST Additional Contrast? None   Final Result   1. Acute segmental to subsegmental pulmonary emboli noted in the right lower   lobe, with a nonocclusive embolism in the right middle lobe. No central   emboli or findings to suggest cardiac strain. This was discussed with Dr. Sheri Cruz in the emergency department at 3:24 a.m. on 12/09/2022.   2. Subsegmental ground-glass infiltrate in the right lower lobe felt to   represent a pulmonary infarct. 3. Patchy atelectatic changes seen otherwise. 4. Atherosclerosis including coronary artery involvement. 5. There is an acute to subacute mild superior endplate compression fracture   of L2 with approximately 20-30% height loss. 6. No acute intra-abdominal or pelvic abnormality. CT CHEST PULMONARY EMBOLISM W CONTRAST   Final Result   1. Acute segmental to subsegmental pulmonary emboli noted in the right lower   lobe, with a nonocclusive embolism in the right middle lobe. No central   emboli or findings to suggest cardiac strain.   This was discussed with Dr. Sheri Cruz in the emergency department at 3:24 a.m. on 12/09/2022.   2. Subsegmental ground-glass infiltrate in the right lower lobe felt to   represent a pulmonary infarct. 3. Patchy atelectatic changes seen otherwise. 4. Atherosclerosis including coronary artery involvement. 5. There is an acute to subacute mild superior endplate compression fracture   of L2 with approximately 20-30% height loss. 6. No acute intra-abdominal or pelvic abnormality. Consults:     IP CONSULT TO HOSPITALIST    Disposition:  home     Condition at Discharge: Stable    Discharge Instructions/Follow-up:  follow up with PCP within 1-2 weeks    Code Status:  full code    Activity: activity as tolerated    Diet: regular diet      Discharge Medications:     Discharge Medication List as of 12/10/2022 12:42 PM             Details   apixaban starter pack (ELIQUIS DVT/PE STARTER PACK) 5 MG TBPK tablet Take 1 tablet by mouth See Admin Instructions, Disp-74 tablet, R-0Normal                Details   lisinopril (PRINIVIL;ZESTRIL) 5 MG tablet Take 1 tablet by mouth daily, Disp-30 tablet, R-0Normal      atorvastatin (LIPITOR) 80 MG tablet Take 1 tablet by mouth daily, Disp-30 tablet, R-0Needs appt for future refillsNormal                Details   aspirin 81 MG EC tablet Take 1 tablet by mouth daily, Disp-30 tablet, R-3             Time Spent on discharge: 37 min in the examination, evaluation, counseling and review of medications and discharge plan. Signed:    Ranjit Waller MD   12/10/2022      Thank you Sebastien Valenzuela for the opportunity to be involved in this patient's care. If you have any questions or concerns, please feel free to contact me at [phone].

## 2025-03-19 ENCOUNTER — HOSPITAL ENCOUNTER (EMERGENCY)
Age: 70
Discharge: HOME | End: 2025-03-19
Payer: MEDICARE

## 2025-03-19 ENCOUNTER — APPOINTMENT (OUTPATIENT)
Dept: GENERAL RADIOLOGY | Age: 70
End: 2025-03-19
Payer: MEDICARE

## 2025-03-19 VITALS
BODY MASS INDEX: 28.77 KG/M2 | HEIGHT: 74 IN | TEMPERATURE: 98.3 F | SYSTOLIC BLOOD PRESSURE: 154 MMHG | WEIGHT: 224.2 LBS | HEART RATE: 72 BPM | OXYGEN SATURATION: 95 % | DIASTOLIC BLOOD PRESSURE: 102 MMHG | RESPIRATION RATE: 16 BRPM

## 2025-03-19 DIAGNOSIS — R07.89 MUSCULOSKELETAL CHEST PAIN: Primary | ICD-10-CM

## 2025-03-19 DIAGNOSIS — R03.0 ELEVATED BLOOD PRESSURE READING: ICD-10-CM

## 2025-03-19 LAB
ALBUMIN SERPL-MCNC: 3.9 G/DL (ref 3.4–5)
ALBUMIN/GLOB SERPL: 1.1 {RATIO} (ref 1.1–2.2)
ALP SERPL-CCNC: 85 U/L (ref 40–129)
ALT SERPL-CCNC: 19 U/L (ref 10–40)
ANION GAP SERPL CALCULATED.3IONS-SCNC: 12 MMOL/L (ref 3–16)
AST SERPL-CCNC: 22 U/L (ref 15–37)
BASOPHILS # BLD: 0.1 K/UL (ref 0–0.2)
BASOPHILS NFR BLD: 1.1 %
BILIRUB SERPL-MCNC: 0.3 MG/DL (ref 0–1)
BUN SERPL-MCNC: 15 MG/DL (ref 7–20)
CALCIUM SERPL-MCNC: 9.9 MG/DL (ref 8.3–10.6)
CHLORIDE SERPL-SCNC: 103 MMOL/L (ref 99–110)
CO2 SERPL-SCNC: 22 MMOL/L (ref 21–32)
CREAT SERPL-MCNC: 0.9 MG/DL (ref 0.8–1.3)
D-DIMER QUANTITATIVE: 0.4 UG/ML FEU (ref 0–0.6)
DEPRECATED RDW RBC AUTO: 13.1 % (ref 12.4–15.4)
EOSINOPHIL # BLD: 0.3 K/UL (ref 0–0.6)
EOSINOPHIL NFR BLD: 2.9 %
GFR SERPLBLD CREATININE-BSD FMLA CKD-EPI: >90 ML/MIN/{1.73_M2}
GLUCOSE SERPL-MCNC: 97 MG/DL (ref 70–99)
HCT VFR BLD AUTO: 44.1 % (ref 40.5–52.5)
HGB BLD-MCNC: 15.3 G/DL (ref 13.5–17.5)
LYMPHOCYTES # BLD: 3 K/UL (ref 1–5.1)
LYMPHOCYTES NFR BLD: 27 %
MCH RBC QN AUTO: 31.7 PG (ref 26–34)
MCHC RBC AUTO-ENTMCNC: 34.6 G/DL (ref 31–36)
MCV RBC AUTO: 91.6 FL (ref 80–100)
MONOCYTES # BLD: 1.1 K/UL (ref 0–1.3)
MONOCYTES NFR BLD: 10.2 %
NEUTROPHILS # BLD: 6.5 K/UL (ref 1.7–7.7)
NEUTROPHILS NFR BLD: 58.8 %
PLATELET # BLD AUTO: 273 K/UL (ref 135–450)
PMV BLD AUTO: 7.7 FL (ref 5–10.5)
POTASSIUM SERPL-SCNC: 4 MMOL/L (ref 3.5–5.1)
PROT SERPL-MCNC: 7.3 G/DL (ref 6.4–8.2)
RBC # BLD AUTO: 4.82 M/UL (ref 4.2–5.9)
SODIUM SERPL-SCNC: 137 MMOL/L (ref 136–145)
TROPONIN, HIGH SENSITIVITY: 8 NG/L (ref 0–22)
WBC # BLD AUTO: 11 K/UL (ref 4–11)

## 2025-03-19 PROCEDURE — 71045 X-RAY EXAM CHEST 1 VIEW: CPT

## 2025-03-19 PROCEDURE — 85379 FIBRIN DEGRADATION QUANT: CPT

## 2025-03-19 PROCEDURE — 84484 ASSAY OF TROPONIN QUANT: CPT

## 2025-03-19 PROCEDURE — 80053 COMPREHEN METABOLIC PANEL: CPT

## 2025-03-19 PROCEDURE — 93005 ELECTROCARDIOGRAM TRACING: CPT | Performed by: EMERGENCY MEDICINE

## 2025-03-19 PROCEDURE — 99285 EMERGENCY DEPT VISIT HI MDM: CPT

## 2025-03-19 PROCEDURE — 36415 COLL VENOUS BLD VENIPUNCTURE: CPT

## 2025-03-19 PROCEDURE — 85025 COMPLETE CBC W/AUTO DIFF WBC: CPT

## 2025-03-19 RX ORDER — METHOCARBAMOL 750 MG/1
750 TABLET, FILM COATED ORAL 4 TIMES DAILY
Qty: 40 TABLET | Refills: 0 | Status: SHIPPED | OUTPATIENT
Start: 2025-03-19 | End: 2025-03-29

## 2025-03-19 ASSESSMENT — PAIN SCALES - GENERAL: PAINLEVEL_OUTOF10: 3

## 2025-03-19 ASSESSMENT — PAIN - FUNCTIONAL ASSESSMENT: PAIN_FUNCTIONAL_ASSESSMENT: 0-10

## 2025-03-19 ASSESSMENT — LIFESTYLE VARIABLES
HOW OFTEN DO YOU HAVE A DRINK CONTAINING ALCOHOL: NEVER
HOW MANY STANDARD DRINKS CONTAINING ALCOHOL DO YOU HAVE ON A TYPICAL DAY: PATIENT DOES NOT DRINK

## 2025-03-20 NOTE — ED PROVIDER NOTES
[facility] EMERGENCY DEPARTMENT  EMERGENCY DEPARTMENT ENCOUNTER        Pt Name: Cristopher Fink  MRN: [id number]  Birthdate 1955  Date of evaluation: 3/19/2025  Provider: PAULA Guillaume Jr  PCP: Amanda Navarrete APRN - CNP  Note Started: 12:36 AM EDT 3/20/25      RACIEL. I have evaluated this patient.        CHIEF COMPLAINT       Chief Complaint   Patient presents with    Chest Pain     Pt. Reports sternal pain, only with movement. Pt. Has cardiac history       HISTORY OF PRESENT ILLNESS: 1 or more Elements     History from : Patient    Limitations to history : None    Cristopher Fink is a 69 y.o. male who presents with complaints of midsternal and left-sided chest pain that occurs while he is moving his torso or his arms around.  He states this started yesterday.  He did note that he had what he assumed to be a boil on his back the night before and when he woke up the following morning it was gone so he was concerned that it may have \"gotten pushed and was pushing on my heart\".  He is not experiencing any nausea or vomiting.  He does endorse a prior history of cardiac stent placement as well as pulmonary embolism and DVT.  He is on aspirin but does not take any other antiplatelet or anticoagulant medication.  He is denying any fevers chills or bodyaches otherwise.  No other complaints at this time.  Review of systems otherwise negative.    Nursing Notes were all reviewed and agreed with or any disagreements were addressed in the HPI.      SURGICAL HISTORY     Past Surgical History:   Procedure Laterality Date    APPENDECTOMY      CORONARY ANGIOPLASTY WITH STENT PLACEMENT  5/25/15       CURRENTMEDICATIONS       Discharge Medication List as of 3/19/2025 10:58 PM        CONTINUE these medications which have NOT CHANGED    Details   apixaban starter pack (ELIQUIS DVT/PE STARTER PACK) 5 MG TBPK tablet Take 1 tablet by mouth See Admin Instructions, Disp-74 tablet, R-0Normal      lisinopril

## 2025-03-20 NOTE — ED PROVIDER NOTES
EKG interpretation    I was asked to interpret this EKG.  I have not seen or evaluated this patient.  My interpretation is as follows:    Rhythm is sinus rhythm with occasional PVCs  Rate is 75  Axis is left deviated  No STEMI criteria  Qtc is 417    10:08 PM        Adam Yoder MD  03/19/25 0312

## 2025-03-21 LAB
EKG ATRIAL RATE: 74 BPM
EKG DIAGNOSIS: NORMAL
EKG P AXIS: 32 DEGREES
EKG P-R INTERVAL: 134 MS
EKG Q-T INTERVAL: 376 MS
EKG QRS DURATION: 86 MS
EKG QTC CALCULATION (BAZETT): 417 MS
EKG R AXIS: -58 DEGREES
EKG T AXIS: 80 DEGREES
EKG VENTRICULAR RATE: 74 BPM

## 2025-03-21 PROCEDURE — 93010 ELECTROCARDIOGRAM REPORT: CPT | Performed by: INTERNAL MEDICINE

## 2025-07-07 ENCOUNTER — TRANSCRIBE ORDERS (OUTPATIENT)
Dept: ADMINISTRATIVE | Age: 70
End: 2025-07-07

## 2025-07-07 DIAGNOSIS — G25.2 POSTURAL TREMOR: ICD-10-CM

## 2025-07-07 DIAGNOSIS — R26.0 TITUBATION: Primary | ICD-10-CM

## 2025-07-07 DIAGNOSIS — G25.2 COARSE TREMORS: ICD-10-CM

## 2025-07-07 DIAGNOSIS — G25.2 RESTING TREMOR: ICD-10-CM

## 2025-07-16 ENCOUNTER — HOSPITAL ENCOUNTER (OUTPATIENT)
Dept: NUCLEAR MEDICINE | Age: 70
Discharge: HOME | End: 2025-07-16
Attending: PSYCHIATRY & NEUROLOGY
Payer: MEDICARE

## 2025-07-16 DIAGNOSIS — G25.2 POSTURAL TREMOR: ICD-10-CM

## 2025-07-16 DIAGNOSIS — G25.2 RESTING TREMOR: ICD-10-CM

## 2025-07-16 DIAGNOSIS — G25.2 COARSE TREMORS: ICD-10-CM

## 2025-07-16 DIAGNOSIS — R26.0 TITUBATION: ICD-10-CM

## 2025-07-16 PROCEDURE — 78803 RP LOCLZJ TUM SPECT 1 AREA: CPT

## 2025-07-16 PROCEDURE — A9584 IODINE I-123 IOFLUPANE: HCPCS | Performed by: PSYCHIATRY & NEUROLOGY

## 2025-07-16 PROCEDURE — 3430000000 HC RX DIAGNOSTIC RADIOPHARMACEUTICAL: Performed by: PSYCHIATRY & NEUROLOGY

## 2025-07-16 RX ORDER — IODINE SOLUTION STRONG 5% (LUGOL'S) 5 %
9 SOLUTION ORAL
Status: DISCONTINUED | OUTPATIENT
Start: 2025-07-16 | End: 2025-07-17 | Stop reason: HOSPADM

## 2025-07-16 RX ADMIN — IOFLUPANE I-123 4.2 MILLICURIE: 2 INJECTION, SOLUTION INTRAVENOUS at 10:43
